# Patient Record
Sex: FEMALE | Race: WHITE | NOT HISPANIC OR LATINO | URBAN - METROPOLITAN AREA
[De-identification: names, ages, dates, MRNs, and addresses within clinical notes are randomized per-mention and may not be internally consistent; named-entity substitution may affect disease eponyms.]

---

## 2021-02-20 ENCOUNTER — EMERGENCY (EMERGENCY)
Facility: HOSPITAL | Age: 56
LOS: 1 days | Discharge: HOME | End: 2021-02-20
Attending: EMERGENCY MEDICINE | Admitting: EMERGENCY MEDICINE
Payer: COMMERCIAL

## 2021-02-20 VITALS
SYSTOLIC BLOOD PRESSURE: 135 MMHG | HEART RATE: 68 BPM | TEMPERATURE: 98 F | DIASTOLIC BLOOD PRESSURE: 72 MMHG | RESPIRATION RATE: 16 BRPM | OXYGEN SATURATION: 100 %

## 2021-02-20 VITALS
DIASTOLIC BLOOD PRESSURE: 96 MMHG | WEIGHT: 134.92 LBS | SYSTOLIC BLOOD PRESSURE: 183 MMHG | OXYGEN SATURATION: 98 % | HEART RATE: 95 BPM | TEMPERATURE: 98 F | HEIGHT: 60 IN | RESPIRATION RATE: 18 BRPM

## 2021-02-20 DIAGNOSIS — Z98.89 OTHER SPECIFIED POSTPROCEDURAL STATES: Chronic | ICD-10-CM

## 2021-02-20 LAB
ALBUMIN SERPL ELPH-MCNC: 3.8 G/DL — SIGNIFICANT CHANGE UP (ref 3.3–5)
ALP SERPL-CCNC: 60 U/L — SIGNIFICANT CHANGE UP (ref 40–120)
ALT FLD-CCNC: 16 U/L — SIGNIFICANT CHANGE UP (ref 12–78)
ANION GAP SERPL CALC-SCNC: 6 MMOL/L — SIGNIFICANT CHANGE UP (ref 5–17)
AST SERPL-CCNC: 13 U/L — LOW (ref 15–37)
BASOPHILS # BLD AUTO: 0.03 K/UL — SIGNIFICANT CHANGE UP (ref 0–0.2)
BASOPHILS NFR BLD AUTO: 0.4 % — SIGNIFICANT CHANGE UP (ref 0–2)
BILIRUB SERPL-MCNC: 0.4 MG/DL — SIGNIFICANT CHANGE UP (ref 0.2–1.2)
BUN SERPL-MCNC: 15 MG/DL — SIGNIFICANT CHANGE UP (ref 7–23)
CALCIUM SERPL-MCNC: 9 MG/DL — SIGNIFICANT CHANGE UP (ref 8.5–10.1)
CHLORIDE SERPL-SCNC: 107 MMOL/L — SIGNIFICANT CHANGE UP (ref 96–108)
CO2 SERPL-SCNC: 27 MMOL/L — SIGNIFICANT CHANGE UP (ref 22–31)
CREAT SERPL-MCNC: 0.61 MG/DL — SIGNIFICANT CHANGE UP (ref 0.5–1.3)
EOSINOPHIL # BLD AUTO: 0.08 K/UL — SIGNIFICANT CHANGE UP (ref 0–0.5)
EOSINOPHIL NFR BLD AUTO: 0.9 % — SIGNIFICANT CHANGE UP (ref 0–6)
ETHANOL SERPL-MCNC: <10 MG/DL — SIGNIFICANT CHANGE UP (ref 0–10)
GLUCOSE SERPL-MCNC: 107 MG/DL — HIGH (ref 70–99)
HCT VFR BLD CALC: 40.5 % — SIGNIFICANT CHANGE UP (ref 34.5–45)
HGB BLD-MCNC: 13.7 G/DL — SIGNIFICANT CHANGE UP (ref 11.5–15.5)
IMM GRANULOCYTES NFR BLD AUTO: 0.4 % — SIGNIFICANT CHANGE UP (ref 0–1.5)
LYMPHOCYTES # BLD AUTO: 2.77 K/UL — SIGNIFICANT CHANGE UP (ref 1–3.3)
LYMPHOCYTES # BLD AUTO: 32.4 % — SIGNIFICANT CHANGE UP (ref 13–44)
MAGNESIUM SERPL-MCNC: 2.4 MG/DL — SIGNIFICANT CHANGE UP (ref 1.6–2.6)
MCHC RBC-ENTMCNC: 32.2 PG — SIGNIFICANT CHANGE UP (ref 27–34)
MCHC RBC-ENTMCNC: 33.8 GM/DL — SIGNIFICANT CHANGE UP (ref 32–36)
MCV RBC AUTO: 95.1 FL — SIGNIFICANT CHANGE UP (ref 80–100)
MONOCYTES # BLD AUTO: 0.63 K/UL — SIGNIFICANT CHANGE UP (ref 0–0.9)
MONOCYTES NFR BLD AUTO: 7.4 % — SIGNIFICANT CHANGE UP (ref 2–14)
NEUTROPHILS # BLD AUTO: 5.02 K/UL — SIGNIFICANT CHANGE UP (ref 1.8–7.4)
NEUTROPHILS NFR BLD AUTO: 58.5 % — SIGNIFICANT CHANGE UP (ref 43–77)
NRBC # BLD: 0 /100 WBCS — SIGNIFICANT CHANGE UP (ref 0–0)
PLATELET # BLD AUTO: 279 K/UL — SIGNIFICANT CHANGE UP (ref 150–400)
POTASSIUM SERPL-MCNC: 4.5 MMOL/L — SIGNIFICANT CHANGE UP (ref 3.5–5.3)
POTASSIUM SERPL-SCNC: 4.5 MMOL/L — SIGNIFICANT CHANGE UP (ref 3.5–5.3)
PROT SERPL-MCNC: 7.2 G/DL — SIGNIFICANT CHANGE UP (ref 6–8.3)
RBC # BLD: 4.26 M/UL — SIGNIFICANT CHANGE UP (ref 3.8–5.2)
RBC # FLD: 13.3 % — SIGNIFICANT CHANGE UP (ref 10.3–14.5)
SODIUM SERPL-SCNC: 140 MMOL/L — SIGNIFICANT CHANGE UP (ref 135–145)
TROPONIN I SERPL-MCNC: <.015 NG/ML — SIGNIFICANT CHANGE UP (ref 0.01–0.04)
WBC # BLD: 8.56 K/UL — SIGNIFICANT CHANGE UP (ref 3.8–10.5)
WBC # FLD AUTO: 8.56 K/UL — SIGNIFICANT CHANGE UP (ref 3.8–10.5)

## 2021-02-20 PROCEDURE — 80307 DRUG TEST PRSMV CHEM ANLYZR: CPT

## 2021-02-20 PROCEDURE — 93010 ELECTROCARDIOGRAM REPORT: CPT

## 2021-02-20 PROCEDURE — 99284 EMERGENCY DEPT VISIT MOD MDM: CPT | Mod: 25

## 2021-02-20 PROCEDURE — 70450 CT HEAD/BRAIN W/O DYE: CPT

## 2021-02-20 PROCEDURE — 99285 EMERGENCY DEPT VISIT HI MDM: CPT

## 2021-02-20 PROCEDURE — 85025 COMPLETE CBC W/AUTO DIFF WBC: CPT

## 2021-02-20 PROCEDURE — 80053 COMPREHEN METABOLIC PANEL: CPT

## 2021-02-20 PROCEDURE — 84484 ASSAY OF TROPONIN QUANT: CPT

## 2021-02-20 PROCEDURE — 70450 CT HEAD/BRAIN W/O DYE: CPT | Mod: 26,MA

## 2021-02-20 PROCEDURE — 36415 COLL VENOUS BLD VENIPUNCTURE: CPT

## 2021-02-20 PROCEDURE — 96360 HYDRATION IV INFUSION INIT: CPT

## 2021-02-20 PROCEDURE — 83735 ASSAY OF MAGNESIUM: CPT

## 2021-02-20 PROCEDURE — 93005 ELECTROCARDIOGRAM TRACING: CPT

## 2021-02-20 PROCEDURE — 82962 GLUCOSE BLOOD TEST: CPT

## 2021-02-20 RX ORDER — SODIUM CHLORIDE 9 MG/ML
1000 INJECTION INTRAMUSCULAR; INTRAVENOUS; SUBCUTANEOUS ONCE
Refills: 0 | Status: COMPLETED | OUTPATIENT
Start: 2021-02-20 | End: 2021-02-20

## 2021-02-20 RX ADMIN — SODIUM CHLORIDE 1000 MILLILITER(S): 9 INJECTION INTRAMUSCULAR; INTRAVENOUS; SUBCUTANEOUS at 11:56

## 2021-02-20 RX ADMIN — SODIUM CHLORIDE 1000 MILLILITER(S): 9 INJECTION INTRAMUSCULAR; INTRAVENOUS; SUBCUTANEOUS at 13:00

## 2021-02-20 NOTE — ED ADULT NURSE NOTE - CHPI ED NUR TIMING2
Patient was intubated with a number 7.5 ET Tube. Tube placement verified by auscultation and ETCO2 monitor. ET Tube is secured at the 25 cm gini at the lip and on the bilateral side. Patient was intubated by [facility] on the 1 attempt. Breath sounds are coarse. Patient is Negative for subcutaneous air and chest excursion is symmetric. Trachea is midline. Patient is also Negative for cyanosis and is Negative for pitting edema. Patient placed on ventilator on documented settings. All alarms are set and audible. Resuscitation bag is at the head of the bed.       Ventilator Settings  Mode FIO2 Rate Tidal Volume Pressure PEEP I:E Ratio   PRVC  40 %    500 ml     5 cm H20         Peak airway pressure: 9 cm H2O   Minute ventilation: 6.6 l/min     Lenora Magdaleno RRT sudden onset/intermittent

## 2021-02-20 NOTE — ED PROVIDER NOTE - ATTENDING CONTRIBUTION TO CARE
56 yo F p/w co feeling slight on / of flushed with minimal assoc dizziness - had few , very short episodes. no assoc cp/sob. no palpitations. no fall / recent trauma.. Pt with hx ETOH abuse, no recent change in her intake. No fever/chills. no cough/uri. no known covid exposure / prior infxn / immunization. no agg/allev factors. No other inj or co. no recent LUZ / easy fatigue. no recent travel / immob. no other acute co.  exam : MM Moist. neck supple. no meningeal signs. abd soft NT, no hsm. no cvat. nl non-focal neuro exam. nl resp effort. no acc musxle use. No other acute findings.  check labs, xr, ct, IVF, reeval

## 2021-02-20 NOTE — ED PROVIDER NOTE - OBJECTIVE STATEMENT
Pt is a 56 yo female with no pmhx c/o of sudden onset of flush feeling at work and lightheadedness while standing at work symptoms lasted a few seconds resolved and returned again. Pt states she states her whole body is shaking. Pt denies any headache blurry vision numbness tingling weakness abdominal pain + nausea.   + smoker and drinks a few beers everyday   pcp Master

## 2021-02-20 NOTE — ED PROVIDER NOTE - NSFOLLOWUPINSTRUCTIONS_ED_ALL_ED_FT
1)  Follow-up with your Primary Medical Doctor or referred doctor. Call monday for prompt follow-up.  2) Follow-up with Cardiology as discussed. Call monday for prompt follow-up and further workup and evaluation.  3) Return to Emergency room for any worsening or persistent pain, shortness of breath, weakness, fever, abdominal pain, dizziness, passing out, unexplained pain in arms, legs, back, any vomiting, feeling like your heart is racing,  or any other concerning symptoms.  4) See attached instruction sheets for additional information, including information regarding signs and symptoms to look out for, reasons to seek immediate care and other important instructions.   5) Plenty off fluids

## 2021-02-20 NOTE — ED ADULT NURSE NOTE - OBJECTIVE STATEMENT
received pt in bed #13a Pt A&O "I was working & then something came over me & I got lightheaded & nauseous really quickly & then I just stopped what I was doing took a breath & then it just went away...I continued my work then it started again". Pt still complaining of feeling lightheaded & nausea "but not as bad"

## 2021-02-20 NOTE — ED PROVIDER NOTE - CARE PROVIDERS DIRECT ADDRESSES
[email or web address]

## 2021-02-20 NOTE — ED PROVIDER NOTE - PROVIDER TOKENS
PROVIDER:[TOKEN:[[id number]]],PROVIDER:[TOKEN:[[id number]]] PROVIDER:[TOKEN:[[id number]]],PROVIDER:[TOKEN:[[id number]]],PROVIDER:[TOKEN:[[id number]]]

## 2021-02-20 NOTE — ED PROVIDER NOTE - CARE PROVIDER_API CALL
Jude Thao  INTERNAL MEDICINE  [address] [place] [address], [address]  [place] [address]  Phone: [phone]  Fax: [phone]  Follow Up Time:     Baldo Doe)  Cardiovascular Disease  [address] [place] [address]  Phone: [phone]  Fax: [phone]  Follow Up Time:    Jude Thao  INTERNAL MEDICINE  [address], [address]  [place] [address]  Phone: [phone]  Fax: [phone]  Follow Up Time:     Baldo Doe)  Cardiovascular Disease  [address] [place] [address]  Phone: [phone]  Fax: [phone]  Follow Up Time:     Sandra Beckwith  NEUROLOGY  [address], [address]  [place] [address]  Phone: [phone]  Fax: [phone]  Follow Up Time:

## 2021-02-20 NOTE — ED PROVIDER NOTE - PATIENT PORTAL LINK FT
You can access the FollowMyHealth Patient Portal offered by [facility] by registering at the following website: [email or web address]. By joining [facility] [employer] [facility]’s FollowMyHealth portal, you will also be able to view your health information using other applications (apps) compatible with our system.

## 2024-06-10 NOTE — ED ADULT NURSE NOTE - CADM POA PRESS ULCER
AFTER VISIT SUMMARY (AVS):    At today's visit we thoroughly discussed current symptoms, available treatment options, and the plan.    We decided to repeat lumbar spine MRI without making medication adjustments.  We discussed treatment options that will be based on MRI results.  I refilled your prescriptions for duloxetine, gabapentin, and amitriptyline.    Next follow-up appointment is in the next 2-4 weeks or earlier if needed.    Please do not hesitate to call me with any questions or concerns.    Thanks.    No

## 2024-10-28 NOTE — ED ADULT NURSE NOTE - NSHOSCREENINGQ1_ED_ALL_ED
[facility] - Med Surg ([facility])  Adult Nutrition  Consult Note    SUMMARY     Recommendations  1) Consider liberalized diet / per pt's request   2) Encourage intake of meals & commercial beverages as tolerated    3) RD to monitor and follow up    Goals: Pt will meet > 50% EEN/EPN by RD follow up  Nutrition Goal Status: new  Communication of RD Recs:  (POC)    Assessment and Plan    Endocrine  Severe protein-calorie malnutrition  Malnutrition Type:  Context: social/environmental circumstances  Level: severe    Related to (etiology):   Advanced age    Signs and Symptoms (as evidenced by):   Body mass index is 14.1 kg/m².; 69% IBW    Inability to manage self care 2/2 falls    Malnutrition Characteristic Summary:  Weight Loss (Malnutrition):  (BMI 14)  Subcutaneous Fat (Malnutrition): severe depletion  Muscle Mass (Malnutrition): severe depletion      Interventions/Recommendations (treatment strategy):  Collaboration of care with other providers  Commercial beverages    Nutrition Diagnosis Status:   New         Malnutrition Assessment  Malnutrition Context: social/environmental circumstances  Malnutrition Level: severe  Skin (Micronutrient): thinned, turgor reduced, wounds unhealed (Andres 13)  Teeth (Micronutrient): dentures, edentulous  Neck/Chest (Micronutrient): bony prominence   Micronutrient Evaluation Summary: suspected deficiency   Weight Loss (Malnutrition):  (BMI 14)  Subcutaneous Fat (Malnutrition): severe depletion  Muscle Mass (Malnutrition): severe depletion   Orbital Region (Subcutaneous Fat Loss): severe depletion  Upper Arm Region (Subcutaneous Fat Loss): severe depletion  Thoracic and Lumbar Region: severe depletion   [place] Region (Muscle Loss): mild depletion  Clavicle Bone Region (Muscle Loss): moderate depletion  Clavicle and Acromion Bone Region (Muscle Loss): severe depletion  Scapular Bone Region (Muscle Loss): severe depletion  Dorsal Hand (Muscle Loss): severe depletion  Patellar Region (Muscle  "Loss): severe depletion  Anterior Thigh Region (Muscle Loss): severe depletion  Posterior Calf Region (Muscle Loss): severe depletion                 Reason for Assessment    Reason For Assessment: consult (malnutrition)  Diagnosis:  (Falls)  General Information Comments: Patient currently receiving a cardiac diet. RN reports patient is missing dentures, but able to tolerate meals with some chewing difficulties. Family should be dropping dentures off today. Reviewed with patient meal plan and patient stated she should be able to tolerate L/D (tuna sandwich/fish). Pt has never tried Boost commercial beverages d/t lactose intolerance. Discussed ONS being  appropriate for people with lactose intolerance, unsure if patient is willing to try. Boost already added to diet order. Pt lives alone and has been having falls more frequently. PIV. Andres 13 - L/R leg, sacral spine. Pt meets criteria for severe malnutrition in context of social circumstances r/t inability to meet nutrient needs aeb advanced age; BMI 14; severe fat loss and muscle depletion.  Nutrition Discharge Planning: dc on liberalized diet / per pt's request    Nutrition Related Social Determinants of Health: SDOH: Adequate food in home environment    Nutrition Risk Screen    Nutrition Risk Screen: difficulty chewing/swallowing    Nutrition/Diet History    Food Preferences: none identified  Spiritual, Cultural Beliefs, [employer] Practices, Values that Affect Care: no  Food Allergies:  (strawberry, chocolate)  Factors Affecting Nutritional Intake: decreased appetite, socio-economic, chewing difficulties/inability to chew food    Anthropometrics    Temp: 98.3 °F (36.8 °C)  Height Method: Stated  Height: 5' 3" (160 cm)  Height (inches): 63 in  Weight Method: Bed Scale  Weight: 36.1 kg (79 lb 9.4 oz)  Weight (lb): 79.59 lb  Ideal Body Weight (IBW), Female: 115 lb  % Ideal Body Weight, Female (lb): 69.21 %  % Ideal Body Weight Malnutrition: less than 70% -severe " deficit  BMI (Calculated): 14.1  BMI Grade: less than 16 protein-energy malnutrition grade III  Weight Loss: unintentional  Usual Body Weight (UBW), k.5 kg (2024)  % Usual Body Weight: 91.58  % Weight Change From Usual Weight: -8.61 %       Lab/Procedures/Meds    Pertinent Labs Reviewed: reviewed  CBC:  Recent Labs   Lab 10/28/24  0457   WBC 5.05   HGB 10.8*   HCT 32.6*        CMP:  Recent Labs   Lab 10/28/24  0457   CALCIUM 8.4*      K 3.2*   CO2 25      BUN 13   CREATININE 0.7     BMP:   Recent Labs   Lab 10/28/24  0457   GLU 89      K 3.2*      CO2 25   BUN 13   CREATININE 0.7   CALCIUM 8.4*   MG 1.8       Pertinent Medications Reviewed: reviewed  Scheduled Meds:   aspirin  81 mg Oral Daily    bimatoprost  1 drop Both Eyes QHS    brimonidine 0.15 % OPTH DROP  1 drop Right Eye TID    ferrous sulfate  1 tablet Oral Daily    multivitamin  1 tablet Oral Daily    pilocarpine HCL 4%  1 drop Right Eye TID    sodium chloride 0.9%  10 mL Intravenous Q8H    valsartan  160 mg Oral Daily     Continuous Infusions:  PRN Meds:.  Current Facility-Administered Medications:     acetaminophen, 650 mg, Oral, Q6H PRN    dextrose 10%, 12.5 g, Intravenous, PRN    dextrose 10%, 25 g, Intravenous, PRN    glucagon (human recombinant), 1 mg, Intramuscular, PRN    glucose, 16 g, Oral, PRN    glucose, 24 g, Oral, PRN    melatonin, 6 mg, Oral, Nightly PRN    methocarbamoL, 500 mg, Oral, TID PRN    naloxone, 0.02 mg, Intravenous, PRN    senna-docusate 8.6-50 mg, 1 tablet, Oral, BID PRN      Estimated/Assessed Needs    Weight Used For Calorie Calculations: 36.1 kg (79 lb 9.4 oz)  Energy Calorie Requirements (kcal): 0393-7475  Energy Need Method: Kcal/kg (30-40)  Protein Requirements: 54 g (1.5 g/kg)  Weight Used For Protein Calculations: 36.1 kg (79 lb 9.4 oz)  Fluid Requirements (mL): 1 mL/kcal  Estimated Fluid Requirement Method:  (or per MD)  RDA Method (mL): 1083         Nutrition Prescription  Ordered    Current Diet Order: Cardiac  Oral Nutrition Supplement: Boost Plus TID    Evaluation of Received Nutrient/Fluid Intake    I/O: + 1.1 L since admit  Energy Calories Required: not meeting needs  Protein Required: not meeting needs  Fluid Required: not meeting needs  Comments: LBM: 10/25  Tolerance: tolerating  % Intake of Estimated Energy Needs: 25 - 50 %  % Meal Intake: 75 - 100 % (breakfast)    Nutrition Risk    Level of Risk/Frequency of Follow-up:  (1-2 x/week)       Monitor and Evaluation    Food and Nutrient Intake: food and beverage intake  Food and Nutrient Adminstration: diet order  Physical Activity and Function: nutrition-related ADLs and IADLs  Anthropometric Measurements: weight change, body mass index, weight  Biochemical Data, Medical Tests and Procedures: electrolyte and renal panel, gastrointestinal profile, glucose/endocrine profile, inflammatory profile, lipid profile  Nutrition-Focused Physical Findings: overall appearance       Nutrition Follow-Up    RD Follow-up?: Yes    Maggy Dow RDN, [facility]     No

## 2024-11-20 NOTE — ED ADULT NURSE NOTE - NS ED NURSE LEVEL OF CONSCIOUSNESS MENTAL STATUS
Rules:  Limit sweets to one day per month  Limit chips/crackers/pretzels/nuts/popcorn to 200 dain/day  Eliminate all sugar sweetened beverages (including fruit juice)  Limit restaurants (including fast food and food from a convenience store) to one time every two weeks while in town    Requirements:  Make sure protein intake is at least 100 grams per day (do not count protein every day; instead spot check your intake every 2-3 weeks and make sure what you think you are getting is close to accurate; consider using a protein shake if needed; these are in the pharmacy section of the stores, not the grocery section; Premier, Pure Protein and Fairlife are relatively inexpensive and taste good to most patients; other options are Nectar, Boost Max, Ensure Max, BeneProtein and GNC lean (which is lactose-free);   Nectar fruit, Premier Protein Clear, IsoPure Protein Drink, and Protein 2 O are water-based options; Quest (or Cosco, which is cheaper and is ordered on Amazon) and the CardioPhotonics 1 protein bars can also be used, but have less protein in them ) (<200 Dain, >25 g protein) - (chicken, fish, turkey, egg white)  (Disclaimer: Dietary supplements rarely have their listed ingredients and the amount of each verified by a third party other. Sometimes they give verification for their claims to be GMO and gluten free and to be organic. However, even such verifications as these may still be untrustworthy.)  Make sure that fiber intake is at least 22 grams per day. Do this in part or whole by taking 12 tablespoons of Fiber one original cereal or Anthony's All Bran Buds cereal, or 4 tablespoons of wheat dextrin powder (Benefiber or generic brand); for both of these, start with 1/8th - 1/4th the target amount and every week add another 1/8th - 1/4th until reaching the target).  Also, fiber gummies containing inulin (such as Nature Mad, Zuleta, Benefiber) or Fiber Choice Pre-biotic tablets containing inulin are also an option.1 cup of  Awake